# Patient Record
Sex: MALE | Race: AMERICAN INDIAN OR ALASKA NATIVE
[De-identification: names, ages, dates, MRNs, and addresses within clinical notes are randomized per-mention and may not be internally consistent; named-entity substitution may affect disease eponyms.]

---

## 2021-01-06 ENCOUNTER — HOSPITAL ENCOUNTER (EMERGENCY)
Dept: HOSPITAL 5 - ED | Age: 36
Discharge: HOME | End: 2021-01-06
Payer: SELF-PAY

## 2021-01-06 VITALS — DIASTOLIC BLOOD PRESSURE: 84 MMHG | SYSTOLIC BLOOD PRESSURE: 141 MMHG
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
## 2021-01-06 DIAGNOSIS — K21.9: ICD-10-CM
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
## 2021-01-06 DIAGNOSIS — Z79.899: ICD-10-CM

## 2021-01-06 DIAGNOSIS — Z98.890: ICD-10-CM

## 2021-01-06 DIAGNOSIS — J45.909: ICD-10-CM

## 2021-01-06 DIAGNOSIS — F17.200: ICD-10-CM

## 2021-01-06 DIAGNOSIS — F12.90: ICD-10-CM
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
## 2021-01-06 DIAGNOSIS — S81.832A: Primary | ICD-10-CM

## 2021-01-06 PROCEDURE — 90715 TDAP VACCINE 7 YRS/> IM: CPT

## 2021-01-06 PROCEDURE — 29505 APPLICATION LONG LEG SPLINT: CPT

## 2021-01-06 PROCEDURE — 96375 TX/PRO/DX INJ NEW DRUG ADDON: CPT

## 2021-01-06 PROCEDURE — 96365 THER/PROPH/DIAG IV INF INIT: CPT

## 2021-01-06 PROCEDURE — 90471 IMMUNIZATION ADMIN: CPT

## 2021-01-06 PROCEDURE — 73590 X-RAY EXAM OF LOWER LEG: CPT

## 2021-01-06 PROCEDURE — 99283 EMERGENCY DEPT VISIT LOW MDM: CPT

## 2021-01-06 PROCEDURE — 73552 X-RAY EXAM OF FEMUR 2/>: CPT
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
